# Patient Record
Sex: MALE | URBAN - METROPOLITAN AREA
[De-identification: names, ages, dates, MRNs, and addresses within clinical notes are randomized per-mention and may not be internally consistent; named-entity substitution may affect disease eponyms.]

---
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
## 2025-03-14 NOTE — PROCEDURE: PRESCRIPTION MEDICATION MANAGEMENT
Detail Level: Zone
Modify Regimen: Do not resume bar soap
Initiate Treatment: doxycycline hyclate 100 mg tablet BID x 2months (patient to start if does NOT improve after end of football)-patient states he thinks acne will improve with END of football.
Render In Strict Bullet Format?: No
Continue Regimen: CeraVe hydrating foaming cleanser OR CeraVe acne BPO cleanser QOD on FACE, QD on back/shoulders in shower (previously did NOT like CERA VE HYDRATING CLEANSER (made worse))\\n\\nClindamycin pledgets QD - BID\\n\\ntretinoin 0.025 % topical cream Qhs Apply a pea size amount to entire face  at bedtime\\n\\nCeraVe Lotion QHS before tretinoin\\n\\nCeraVe acne BPO cleanser. QOD - once weekly
No

## 2025-06-09 ENCOUNTER — APPOINTMENT (OUTPATIENT)
Dept: URBAN - METROPOLITAN AREA CLINIC 4 | Facility: CLINIC | Age: 20
Setting detail: DERMATOLOGY
End: 2025-06-09

## 2025-06-09 DIAGNOSIS — L70.0 ACNE VULGARIS: ICD-10-CM

## 2025-06-09 PROCEDURE — ? PRESCRIPTION

## 2025-06-09 PROCEDURE — ? PRESCRIPTION MEDICATION MANAGEMENT

## 2025-06-09 PROCEDURE — ? COUNSELING

## 2025-06-09 PROCEDURE — ? DIAGNOSIS COMMENT

## 2025-06-09 RX ORDER — CLINDAMYCIN PHOSPHATE 10 MG/ML
SOLUTION TOPICAL BID
Qty: 60 | Refills: 5 | Status: ACTIVE

## 2025-06-09 NOTE — PROCEDURE: DIAGNOSIS COMMENT
Problem: Falls - Risk of  Goal: *Absence of Falls  Description: Document Blanchard Rachelo Fall Risk and appropriate interventions in the flowsheet.   Outcome: Progressing Towards Goal  Note: Fall Risk Interventions:            Medication Interventions: Teach patient to arise slowly                   Problem: Patient Education: Go to Patient Education Activity  Goal: Patient/Family Education  Outcome: Progressing Towards Goal
Detail Level: Zone
Comment: Patient declines to resume tretinoin\\n\\nPreviously tried; tretinion 0.025%
Render Risk Assessment In Note?: no

## 2025-07-21 ENCOUNTER — APPOINTMENT (OUTPATIENT)
Dept: URBAN - METROPOLITAN AREA CLINIC 4 | Facility: CLINIC | Age: 20
Setting detail: DERMATOLOGY
End: 2025-07-21

## 2025-07-21 DIAGNOSIS — L70.0 ACNE VULGARIS: ICD-10-CM

## 2025-07-21 PROCEDURE — ? DIAGNOSIS COMMENT

## 2025-07-21 PROCEDURE — ? PRESCRIPTION

## 2025-07-21 PROCEDURE — ? COUNSELING

## 2025-07-21 PROCEDURE — ? PRESCRIPTION MEDICATION MANAGEMENT

## 2025-07-21 RX ORDER — CLINDAMYCIN PHOSPHATE 10 MG/ML
SOLUTION TOPICAL BID
Qty: 60 | Refills: 5 | Status: ACTIVE